# Patient Record
Sex: FEMALE | Employment: OTHER | URBAN - METROPOLITAN AREA
[De-identification: names, ages, dates, MRNs, and addresses within clinical notes are randomized per-mention and may not be internally consistent; named-entity substitution may affect disease eponyms.]

---

## 2021-05-25 ENCOUNTER — RECORDS - HEALTHEAST (OUTPATIENT)
Dept: ADMINISTRATIVE | Facility: CLINIC | Age: 61
End: 2021-05-25

## 2021-05-26 ENCOUNTER — RECORDS - HEALTHEAST (OUTPATIENT)
Dept: ADMINISTRATIVE | Facility: CLINIC | Age: 61
End: 2021-05-26

## 2021-07-13 ENCOUNTER — RECORDS - HEALTHEAST (OUTPATIENT)
Dept: ADMINISTRATIVE | Facility: CLINIC | Age: 61
End: 2021-07-13

## 2024-11-25 ENCOUNTER — TELEPHONE (OUTPATIENT)
Dept: OBGYN | Facility: CLINIC | Age: 64
End: 2024-11-25
Payer: COMMERCIAL

## 2024-11-25 NOTE — TELEPHONE ENCOUNTER
Reason for Call:  Appointment Request    Patient requesting this type of appt:  Bladder Prolapse     Requested provider: Babs Lui     Reason patient unable to be scheduled: Not within requested timeframe    When does patient want to be seen/preferred time: 1-2 days asap     Comments: Patient is experiencing extreme discomfort. Feels like it pulling.     Okay to leave a detailed message?: Yes at Cell number on file:    Telephone Information:   Mobile [phone]       Call taken on 11/25/2024 at 1:55 PM by Henrique Hutton

## 2024-11-25 NOTE — TELEPHONE ENCOUNTER
LM with pt for earlier appt with Dr. Abreu on 12/2/24 @ 8 or 9 am in [facility].    Flores Nickerson  Patient

## 2024-12-04 ENCOUNTER — OFFICE VISIT (OUTPATIENT)
Dept: OBGYN | Facility: CLINIC | Age: 64
End: 2024-12-04
Payer: COMMERCIAL

## 2024-12-04 VITALS
HEIGHT: 64 IN | BODY MASS INDEX: 24.01 KG/M2 | HEART RATE: 83 BPM | RESPIRATION RATE: 16 BRPM | TEMPERATURE: 97.9 F | DIASTOLIC BLOOD PRESSURE: 81 MMHG | WEIGHT: 140.6 LBS | SYSTOLIC BLOOD PRESSURE: 179 MMHG

## 2024-12-04 DIAGNOSIS — N81.10 FEMALE BLADDER PROLAPSE: Primary | ICD-10-CM

## 2024-12-04 DIAGNOSIS — M79.18 MYOFASCIAL PAIN: ICD-10-CM

## 2024-12-04 LAB
ALBUMIN UR-MCNC: NEGATIVE MG/DL
APPEARANCE UR: CLEAR
BACTERIA #/AREA URNS HPF: NORMAL /HPF
BILIRUB UR QL STRIP: NEGATIVE
CLUE CELLS: ABNORMAL
COLOR UR AUTO: YELLOW
GLUCOSE UR STRIP-MCNC: NEGATIVE MG/DL
HGB UR QL STRIP: NEGATIVE
KETONES UR STRIP-MCNC: NEGATIVE MG/DL
LEUKOCYTE ESTERASE UR QL STRIP: NEGATIVE
NITRATE UR QL: NEGATIVE
PH UR STRIP: 6 [PH] (ref 5–7)
RBC #/AREA URNS AUTO: NORMAL /HPF
SP GR UR STRIP: <=1.005 (ref 1–1.03)
SQUAMOUS #/AREA URNS AUTO: NORMAL /LPF
TRICHOMONAS, WET PREP: ABNORMAL
UROBILINOGEN UR STRIP-ACNC: 0.2 E.U./DL
WBC #/AREA URNS AUTO: NORMAL /HPF
WBC'S/HIGH POWER FIELD, WET PREP: ABNORMAL
YEAST, WET PREP: ABNORMAL

## 2024-12-04 PROCEDURE — 99203 OFFICE O/P NEW LOW 30 MIN: CPT | Performed by: OBSTETRICS & GYNECOLOGY

## 2024-12-04 PROCEDURE — 87086 URINE CULTURE/COLONY COUNT: CPT | Performed by: OBSTETRICS & GYNECOLOGY

## 2024-12-04 PROCEDURE — 81001 URINALYSIS AUTO W/SCOPE: CPT | Performed by: OBSTETRICS & GYNECOLOGY

## 2024-12-04 PROCEDURE — 99459 PELVIC EXAMINATION: CPT | Performed by: OBSTETRICS & GYNECOLOGY

## 2024-12-04 PROCEDURE — 87210 SMEAR WET MOUNT SALINE/INK: CPT | Performed by: OBSTETRICS & GYNECOLOGY

## 2024-12-04 RX ORDER — VITAMIN B COMPLEX
1000 TABLET ORAL
COMMUNITY

## 2024-12-04 NOTE — PROGRESS NOTES
Chief Complaint   Patient presents with    Consult     Bladder prolapse         HPI:  Mirella Bello, 64 year old, presents with complaints of prolapse.  She is her  are no longer sexually active.  He has been so that does not seem to be an issue for him.  She has complaints of pain with intercourse.  She has been feeling more of a pulling sensation.  She denies any urinary leakage or pain with urination or blood in the urine.  She had her  look and he did not see that something was prolapsing.  She does not feel anything hanging out when she goes to the bathroom or takes a shower but she does notice more pain in the pelvic area.  She states that sitting on a tennis ball helps her pain significantly.    Surgical History    Surgical History  Surgery Date Site/Laterality Comments   BACK SURGERY 4/2009       OH UNLISTED PROCEDURE ACCESSORY SINUSES         PARTIAL HYSTERECTOMY 2005         Medical History    Medical History  Medical History Date Comments   History of pneumonia           Social History     Socioeconomic History    Marital status:      Spouse name: Not on file    Number of children: Not on file    Years of education: Not on file    Highest education level: Not on file   Occupational History    Not on file   Tobacco Use    Smoking status: Never     Passive exposure: Never    Smokeless tobacco: Never   Vaping Use    Vaping status: Never Used   Substance and Sexual Activity    Alcohol use: Yes     Comment: social    Drug use: Never    Sexual activity: Not Currently     Partners: Male     Birth control/protection: Female Surgical   Other Topics Concern    Not on file   Social History Narrative    Not on file     Social Drivers of Health     Financial Resource Strain: Not on file   Food Insecurity: Not on file   Transportation Needs: Not on file   Physical Activity: Not on file   Stress: Not on file   Social Connections: Unknown (6/18/2024)    Received from [facility]    Social Connections     Frequency of Communication with Friends and Family: Not on file   Interpersonal Safety: Not on file   Housing Stability: Not on file     History reviewed. No pertinent family history.     Current Outpatient Medications   Medication Sig Dispense Refill    Nutritional Supplements (JUICE PLUS FIBRE PO) Take by mouth.      Vitamin D3 (VITAMIN D-1000 MAX ST) 25 mcg (1000 units) tablet Take 1,000 Units by mouth.          Allergies:     Allergies   Allergen Reactions    Amlodipine Anaphylaxis    Lisinopril Anaphylaxis     Tongue swelling    Mold Hives and Itching     Other Reaction(s): Wheezing    Molds & Smuts Hives and Itching     Other Reaction(s): Wheezing    Dust Mite Extract Cough, Itching and Other (See Comments)     sneezing    Dust Mites Cough, Itching and Other (See Comments)     sneezing    Hydrochlorothiazide      Other Reaction(s): Gastrointestinal, GI Upset    Blood in urine and back aches    Metoprolol Other (See Comments)     Increased BP    Prednisone Other (See Comments)     Med decreases immunity.     Med decreases immunity.        ROS:  See HPI      Physical Exam:  BP (!) 179/81 (BP Location: Right arm, Patient Position: Chair, Cuff Size: Adult Regular)   Pulse 83   Temp 97.9  F (36.6  C) (Tympanic)   Resp 16   Ht 1.632 m (5' 4.25\")   Wt 63.8 kg (140 lb 9.6 oz)   BMI 23.95 kg/m       Appearance: well developed, well nourished, no acute distress  Head Exam normocephalic, no lesions or deformities  Abdomen: soft, non-tender, no masses, no organomegaly, no hernia,  Skin: no lesions  Extremities: no edema  Psych: orientated x3    Genitourinary Exam  Vulva: normal, no lesions  Urethral meatus: normal size and location, no lesions or discharge  Urethra: no tenderness or masses  Bladder: no fullness or tenderness  Vagina: atrophic, grade 2 cystocele and grade 1 rectocele  Cervix: surgically absent  Uterus: surgically absent  Adnexa: no palpable masses " bilaterally  POS tenderness over levator ani muscle group bilaterally.      Assessment/Plan:  Encounter Diagnoses   Name Primary?    Female bladder prolapse Yes    Myofascial pain      UA was negative today.    Discussed prolapse and reviewed pictures.  Discussed options including no treatment, splinting, pessaries and surgery with and without mesh.  We tried a size 2 ring, which fell out and a size 3 incontinence dish which was too large. This was done purely to see if her pain improved.  She maybe did feel less prolapse with it.    Discussed myofascial pelvic floor pain and difficulty with treating it.  Discussed the mainstay of treatment is antiinflammatory medications and physical therapy.  She would like to try physical therapy before considering additional prolapse treatment.  She is more bothered by the pain and as sitting on a tennis ball helps, she feels her pain is less likely due to prolapse.  A referral was sent for PT.             Concepción Banuelos MD on 12/4/2024 at 3:38 PM

## 2024-12-04 NOTE — NURSING NOTE
"Initial BP (!) 179/81 (BP Location: Right arm, Patient Position: Chair, Cuff Size: Adult Regular)   Pulse 83   Temp 97.9  F (36.6  C) (Tympanic)   Resp 16   Ht 1.632 m (5' 4.25\")   Wt 63.8 kg (140 lb 9.6 oz)   BMI 23.95 kg/m   Estimated body mass index is 23.95 kg/m  as calculated from the following:    Height as of this encounter: 1.632 m (5' 4.25\").    Weight as of this encounter: 63.8 kg (140 lb 9.6 oz). .  Paulette Hinds, EMILY    "

## 2024-12-04 NOTE — RESULT ENCOUNTER NOTE
Please call Mirella Bello and let her know:  The attached results were normal. Please follow any recommendations discussed in clinic.    Concepción Banuelos MD          12/4/2024 3:46 PM

## 2024-12-05 LAB — BACTERIA UR CULT: NORMAL

## 2025-01-06 ENCOUNTER — THERAPY VISIT (OUTPATIENT)
Dept: PHYSICAL THERAPY | Facility: CLINIC | Age: 65
End: 2025-01-06
Payer: COMMERCIAL

## 2025-01-06 DIAGNOSIS — M79.18 MYOFASCIAL PAIN: ICD-10-CM

## 2025-01-06 DIAGNOSIS — N39.41 URGE INCONTINENCE OF URINE: Primary | ICD-10-CM

## 2025-01-06 PROCEDURE — 97161 PT EVAL LOW COMPLEX 20 MIN: CPT | Mod: GP | Performed by: PHYSICAL THERAPIST

## 2025-01-06 PROCEDURE — 97112 NEUROMUSCULAR REEDUCATION: CPT | Mod: GP | Performed by: PHYSICAL THERAPIST

## 2025-01-06 PROCEDURE — 97140 MANUAL THERAPY 1/> REGIONS: CPT | Mod: GP | Performed by: PHYSICAL THERAPIST

## 2025-01-06 PROCEDURE — 97535 SELF CARE MNGMENT TRAINING: CPT | Mod: GP | Performed by: PHYSICAL THERAPIST

## 2025-01-06 NOTE — PROGRESS NOTES
PHYSICAL THERAPY EVALUATION  Type of Visit: Evaluation              Subjective         Presenting condition or subjective complaint: (Patient-Rptd) cystocele  Date of onset: 12/04/24    Relevant medical history:     Dates & types of surgery: (Patient-Rptd) Hystorectomy    Prior diagnostic imaging/testing results:       Prior therapy history for the same diagnosis, illness or injury: (Patient-Rptd) No        Living Environment  Social support: (Patient-Rptd) With a significant other or spouse   Type of home: (Patient-Rptd) House   Stairs to enter the home:         Ramp: (Patient-Rptd) No   Stairs inside the home: (Patient-Rptd) Yes (Patient-Rptd) 11 Is there a railing: (Patient-Rptd) Yes     Help at home: (Patient-Rptd) None  Equipment owned:       Employment: (Patient-Rptd) No    Hobbies/Interests: (Patient-Rptd) hobby farm gardening [employer] spinning    Patient goals for therapy:      Pain assessment: Pain present  5/10 pain after cleaning for relative today. No time to rest with hips up yet.    Subjective:   She wakes up feeling good, no pain. Wakes at 6am. By 10am she is feeling pain. 3/10 or so. 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   If she doesn't have a chance to lift hips up for rest and recovery, pain is 6-7/10.   Usually at the end of the day her pain is from 5-8/10.   Location of pain: between her legs, L buttock, abdominal discomfort.   When she lies on back with hips up it relieves all the pain.     Does a lot of lifting with hobby farming. Lifting heavy - square charles, feed bags.      Objective      PELVIC EVALUATION  ADDITIONAL HISTORY:  Sex assigned at birth: (Patient-Rptd) Female  Gender identity: (Patient-Rptd) Female    Pronouns:        Bladder History:  Feels bladder filling: (Patient-Rptd) No  Triggers for feeling of inability to wait to go to the bathroom: (Patient-Rptd) Yes (Patient-Rptd) see the toilet  How long can you wait to urinate: (Patient-Rptd) 15  minutes  Gets up at night to urinate: (Patient-Rptd) Yes (Patient-Rptd) 1 or2 times  Can stop the flow of urine when urinating: (Patient-Rptd) No  Volume of urine usually released: (Patient-Rptd) Small   Other issues:    Number of bladder infections in last 12 months:    Fluid intake per day: (Patient-Rptd) 450      Medications taken for bladder: (Patient-Rptd) No     Activities causing urine leak:      Amount of urine typically leaked:    Pads used to help with leaking: (Patient-Rptd) No        During the summer 2024, she did have frequency/urgency with some UI. When she had UI it was often the full contents of the bladder. She decreased her water intake. Last month [place] was in the barn though and she had strong urge and lost all the contents of her bladder.   Denies UI with cough, sneeze, laugh, lifting.   Vaginal dryness: MD recommended coconut oil or olive oil. She has been using coconut oil 2x/day. If she doesn't use as often she feels irritation and dryness, discomfort.       Bowel History:  Frequency of bowel movement: (Patient-Rptd) daily  Consistency of stool: (Patient-Rptd) Soft    Ignores the urge to defecate: (Patient-Rptd) No  Other bowel issues:    Length of time spent trying to have a bowel movement:      Sexual Function History:  Sexual orientation: (Patient-Rptd) Bisexual    Sexually active: (Patient-Rptd) No  Lubrication used: (Patient-Rptd) Yes (Patient-Rptd) Yes  Pelvic pain: (Patient-Rptd) Pelvic exams    Pain or difficulty with orgasms/erection/ejaculation: (Patient-Rptd) Yes    State of menopause: (Patient-Rptd) Post-menopause (I am done with menopause)  Hormone medications: (Patient-Rptd) No      Are you currently pregnant: (Patient-Rptd) No  Number of previous pregnancies: (Patient-Rptd) 2  Number of deliveries: (Patient-Rptd) 2  If you have delivered before, did you have any of these issues during delivery: (Patient-Rptd) Tearing; Episiotomy; Vaginal delivery  Have you been diagnosed with  pelvic prolapse or abdominal separation: (Patient-Rptd) Yes  Do you get regular exercise: (Patient-Rptd) Yes  Have you tried pelvic floor strengthening exercises for 4 weeks: (Patient-Rptd) No  Do you have any history of trauma that is relevant to your care that you d like to share: (Patient-Rptd) No    Recalls episiotomy that tore severely with delivery of first child.     Discussed reason for referral regarding pelvic health needs and external/internal pelvic floor muscle examination with patient/guardian.  Opportunity provided to ask questions and verbal consent for assessment and intervention was given.    POSTURE: WFL  BREATHING: able to demonstrate diaphragmatic breathing    PELVIC EXAM  External Visual Inspection:  At rest: Normal  With voluntary pelvic floor contraction: Present  Relaxation of PFM: Yes  With intra-abdominal pressure: Valsalva: didn't assess perineal body but there was grade 2 POP    Integumentary:   Introitus: Unremarkable    External Digital Palpation per Perineum: denied pain with external palpation    Internal Digital Palpation:  Per Vagina:  Tenderness  Myofascial Resistance to Palpation: Soft, but very tender  Pain was 4-6/10  Did not assess strength or endurance of PFM due to pain and tenderness.   She was able to demonstrate PFM lengthening with diaphragmatic breathing.      Pelvic Organ Prolapse:   Grade 2 - MD diagnosed both cystocele and rectocele      Assessment & Plan   CLINICAL IMPRESSIONS  Medical Diagnosis: myofascial pain    Treatment Diagnosis: pelvic pain   Impression/Assessment: Patient is a 64 year old female with primary complaint of pelvic pain related to pelvic organ prolapse as well as complaints of urge incontinence.  The following significant findings have been identified: Pain, Decreased ROM/flexibility, Decreased strength, Impaired muscle performance, Decreased activity tolerance, and increased pelvic floor tension . These impairments interfere with their ability  to perform self care tasks, recreational activities, household chores, household mobility, and community mobility as compared to previous level of function.     Clinical Decision Making (Complexity):  Clinical Presentation: Stable/Uncomplicated  Clinical Presentation Rationale: based on medical and personal factors listed in PT evaluation  Clinical Decision Making (Complexity): Low complexity    PLAN OF CARE  Treatment Interventions:  Modalities: Biofeedback  Interventions: Manual Therapy, Neuromuscular Re-education, Therapeutic Activity, Therapeutic Exercise, Self-Care/Home Management    Long Term Goals     PT Goal 1  Goal Description: Pt will report onset of pelvic pain no earlier than 12:00pm.  Rationale: to maximize safety and independence with performance of ADLs and functional tasks;to maximize safety and independence within the home;to maximize safety and independence within the community  Goal Progress: Currently pain onset begins by 10am.  Target Date: 03/03/25  PT Goal 2  Goal Description: Demonstrates good lifting mechanics in order to prevent aggravating prolapse.  Rationale: to maximize safety and independence with performance of ADLs and functional tasks;to maximize safety and independence within the home;to maximize safety and independence within the community  Goal Progress: Does a lot of lifting, will assess her mechanics at future visits.  Target Date: 03/03/25      Frequency of Treatment: 1x/wk  Duration of Treatment: 12      Education Assessment:   Learner/Method: Patient;No Barriers to Learning    Risks and benefits of evaluation/treatment have been explained.   Patient/Family/caregiver agrees with Plan of Care.     Evaluation Time:     PT Eval, Low Complexity Minutes (58382): 20     Signing Clinician: Kimmy Tidwell, PT

## 2025-01-13 ENCOUNTER — THERAPY VISIT (OUTPATIENT)
Dept: PHYSICAL THERAPY | Facility: CLINIC | Age: 65
End: 2025-01-13
Payer: COMMERCIAL

## 2025-01-13 DIAGNOSIS — M79.18 MYOFASCIAL PAIN: Primary | ICD-10-CM

## 2025-01-13 DIAGNOSIS — N39.41 URGE INCONTINENCE OF URINE: ICD-10-CM

## 2025-01-13 PROCEDURE — 97535 SELF CARE MNGMENT TRAINING: CPT | Mod: GP | Performed by: PHYSICAL THERAPIST

## 2025-01-13 PROCEDURE — 97140 MANUAL THERAPY 1/> REGIONS: CPT | Mod: GP | Performed by: PHYSICAL THERAPIST

## 2025-01-13 PROCEDURE — 97530 THERAPEUTIC ACTIVITIES: CPT | Mod: GP | Performed by: PHYSICAL THERAPIST

## 2025-01-20 ENCOUNTER — THERAPY VISIT (OUTPATIENT)
Dept: PHYSICAL THERAPY | Facility: CLINIC | Age: 65
End: 2025-01-20
Payer: COMMERCIAL

## 2025-01-20 DIAGNOSIS — M79.18 MYOFASCIAL PAIN: Primary | ICD-10-CM

## 2025-01-20 DIAGNOSIS — N39.41 URGE INCONTINENCE OF URINE: ICD-10-CM

## 2025-01-20 PROCEDURE — 97110 THERAPEUTIC EXERCISES: CPT | Mod: GP | Performed by: PHYSICAL THERAPIST

## 2025-01-20 PROCEDURE — 97112 NEUROMUSCULAR REEDUCATION: CPT | Mod: GP | Performed by: PHYSICAL THERAPIST

## 2025-01-20 PROCEDURE — 97530 THERAPEUTIC ACTIVITIES: CPT | Mod: GP | Performed by: PHYSICAL THERAPIST

## 2025-01-27 ENCOUNTER — THERAPY VISIT (OUTPATIENT)
Dept: PHYSICAL THERAPY | Facility: CLINIC | Age: 65
End: 2025-01-27
Payer: COMMERCIAL

## 2025-01-27 DIAGNOSIS — N39.41 URGE INCONTINENCE OF URINE: ICD-10-CM

## 2025-01-27 DIAGNOSIS — M79.18 MYOFASCIAL PAIN: Primary | ICD-10-CM

## 2025-01-27 PROCEDURE — 97530 THERAPEUTIC ACTIVITIES: CPT | Mod: GP

## 2025-01-27 PROCEDURE — 97535 SELF CARE MNGMENT TRAINING: CPT | Mod: GP

## 2025-01-27 PROCEDURE — 97112 NEUROMUSCULAR REEDUCATION: CPT | Mod: GP

## 2025-03-19 ENCOUNTER — PATIENT OUTREACH (OUTPATIENT)
Dept: OBGYN | Facility: CLINIC | Age: 65
End: 2025-03-19
Payer: COMMERCIAL

## 2025-03-19 NOTE — TELEPHONE ENCOUNTER
"Panel Management Review        Health Maintenance List    Health Maintenance   Topic Date Due    ADVANCE CARE PLANNING  Never done    DIABETES SCREENING  Never done    HIV SCREENING  Never done    PAP  Never done    LIPID  Never done    YEARLY PREVENTIVE VISIT  01/29/2009    Pneumococcal Vaccine: 50+ Years (1 of 1 - PCV) Never done    ZOSTER IMMUNIZATION (1 of 2) Never done    MAMMO SCREENING  11/06/2020    DTAP/TDAP/TD IMMUNIZATION (2 - Td or Tdap) 08/08/2024    INFLUENZA VACCINE (1) 09/01/2024    COVID-19 Vaccine (1 - 2024-25 season) Never done    COLORECTAL CANCER SCREENING  12/11/2024    PHQ-2 (once per calendar year)  01/01/2025    RSV VACCINE (1 - 1-dose 75+ series) 08/31/2036    HEPATITIS C SCREENING  Completed    HPV IMMUNIZATION  Aged Out    MENINGITIS IMMUNIZATION  Aged Out       Composite cancer screening  Chart review shows that this patient is due/due soon for the following Pap Smear, Mammogram, and Colonoscopy  No results found for: \"PAP\"  No past surgical history on file.    Is hysterectomy listed in surgical history? No   Is mastectomy listed in surgical history? No     Summary:    Patient is due/failing the following:   Pap Smear, Mammogram, and Colonoscopy    Action needed: Patient needs office visit for Annual Exam with pap smear, colonoscopy, mammogram.    Type of outreach:  Sent letter.      Staff Signature:  Paulette Hinds CMA      "

## 2025-03-19 NOTE — LETTER
2025      Mirella Bello  [address] [place] [address]              Dear Mirella,    To ensure we are providing the best quality care, we have reviewed your chart and see that you are due for:    Cervical Cancer Screening:    Please call Dept: [phone] to schedule an Annual Exam with Pap Smear.    Breast Cancer Screening:    Please call [facility]  at [phone] to schedule a Mammogram.    Colon Cancer Screening:    If you have received a referral to schedule a Colonoscopy or choose to do a Colonoscopy instead of the FIT/ Cologuard test, please call [phone] to schedule.    If you have received a FIT test kit from a prior office visit, please check the expiration date. If , please get a new kit from the Lab/ Clinic. If not , please complete the test and mail in as soon as you are able.    If you would prefer to complete a Cologuard test, please reach out to your provider to see if this is an option for you.    You may call Dept: [phone] if you have any questions. If you have completed the tests outside of [facility], please have the results forwarded to our office Fax # [phone]. We will update the chart for your primary Physician to review before your next annual physical.    Sincerely,  Concepción Banuelos MD